# Patient Record
Sex: FEMALE | Race: OTHER | NOT HISPANIC OR LATINO
[De-identification: names, ages, dates, MRNs, and addresses within clinical notes are randomized per-mention and may not be internally consistent; named-entity substitution may affect disease eponyms.]

---

## 2017-05-16 ENCOUNTER — LABORATORY RESULT (OUTPATIENT)
Age: 57
End: 2017-05-16

## 2017-05-16 ENCOUNTER — APPOINTMENT (OUTPATIENT)
Dept: ENDOCRINOLOGY | Facility: CLINIC | Age: 57
End: 2017-05-16

## 2017-05-16 VITALS
DIASTOLIC BLOOD PRESSURE: 66 MMHG | HEIGHT: 63 IN | HEART RATE: 70 BPM | WEIGHT: 159 LBS | SYSTOLIC BLOOD PRESSURE: 118 MMHG | BODY MASS INDEX: 28.17 KG/M2

## 2017-05-17 LAB
25(OH)D3 SERPL-MCNC: 28.8 NG/ML
ALBUMIN SERPL ELPH-MCNC: 4.6 G/DL
ALP BLD-CCNC: 101 U/L
ALT SERPL-CCNC: 23 U/L
ANION GAP SERPL CALC-SCNC: 13 MMOL/L
AST SERPL-CCNC: 22 U/L
BILIRUB SERPL-MCNC: 0.2 MG/DL
BUN SERPL-MCNC: 15 MG/DL
CALCIUM SERPL-MCNC: 9.3 MG/DL
CALCIUM SERPL-MCNC: 9.3 MG/DL
CHLORIDE SERPL-SCNC: 105 MMOL/L
CO2 SERPL-SCNC: 28 MMOL/L
CREAT SERPL-MCNC: 0.69 MG/DL
FSH SERPL-MCNC: 56.3 IU/L
GLUCOSE SERPL-MCNC: 98 MG/DL
LH SERPL-ACNC: 26.5 IU/L
MAGNESIUM SERPL-MCNC: 2.3 MG/DL
PARATHYROID HORMONE INTACT: 51 PG/ML
PHOSPHATE SERPL-MCNC: 4 MG/DL
POTASSIUM SERPL-SCNC: 4.3 MMOL/L
PROLACTIN SERPL-MCNC: 34.1 NG/ML
PROT SERPL-MCNC: 7.6 G/DL
SODIUM SERPL-SCNC: 146 MMOL/L
T4 FREE SERPL-MCNC: 1.2 NG/DL
T4 SERPL-MCNC: 8.3 UG/DL
TSH SERPL-ACNC: 1.88 UIU/ML

## 2017-05-18 LAB — COLLAGEN CTX SERPL-MCNC: 204 PG/ML

## 2017-08-15 ENCOUNTER — APPOINTMENT (OUTPATIENT)
Dept: ENDOCRINOLOGY | Facility: CLINIC | Age: 57
End: 2017-08-15
Payer: SELF-PAY

## 2017-08-15 VITALS
HEIGHT: 63 IN | BODY MASS INDEX: 29.41 KG/M2 | DIASTOLIC BLOOD PRESSURE: 72 MMHG | WEIGHT: 166 LBS | SYSTOLIC BLOOD PRESSURE: 118 MMHG | HEART RATE: 68 BPM

## 2017-08-15 PROCEDURE — 99214 OFFICE O/P EST MOD 30 MIN: CPT

## 2017-10-24 ENCOUNTER — LABORATORY RESULT (OUTPATIENT)
Age: 57
End: 2017-10-24

## 2017-10-24 ENCOUNTER — APPOINTMENT (OUTPATIENT)
Dept: ENDOCRINOLOGY | Facility: CLINIC | Age: 57
End: 2017-10-24
Payer: SELF-PAY

## 2017-10-24 VITALS
WEIGHT: 159 LBS | HEIGHT: 63 IN | SYSTOLIC BLOOD PRESSURE: 116 MMHG | BODY MASS INDEX: 28.17 KG/M2 | DIASTOLIC BLOOD PRESSURE: 70 MMHG | HEART RATE: 68 BPM

## 2017-10-24 DIAGNOSIS — M54.2 CERVICALGIA: ICD-10-CM

## 2017-10-24 PROCEDURE — 36415 COLL VENOUS BLD VENIPUNCTURE: CPT

## 2017-10-24 PROCEDURE — 99214 OFFICE O/P EST MOD 30 MIN: CPT | Mod: 25

## 2017-10-25 LAB
PROLACTIN SERPL-MCNC: 30.4 NG/ML
TSH SERPL-ACNC: 1.1 UIU/ML

## 2018-03-05 ENCOUNTER — APPOINTMENT (OUTPATIENT)
Dept: ENDOCRINOLOGY | Facility: CLINIC | Age: 58
End: 2018-03-05

## 2018-04-23 ENCOUNTER — MEDICATION RENEWAL (OUTPATIENT)
Age: 58
End: 2018-04-23

## 2018-09-04 ENCOUNTER — APPOINTMENT (OUTPATIENT)
Dept: ENDOCRINOLOGY | Facility: CLINIC | Age: 58
End: 2018-09-04
Payer: COMMERCIAL

## 2018-09-04 VITALS
DIASTOLIC BLOOD PRESSURE: 73 MMHG | HEIGHT: 63 IN | HEART RATE: 75 BPM | WEIGHT: 160 LBS | BODY MASS INDEX: 28.35 KG/M2 | OXYGEN SATURATION: 94 % | SYSTOLIC BLOOD PRESSURE: 113 MMHG

## 2018-09-04 PROCEDURE — 99214 OFFICE O/P EST MOD 30 MIN: CPT

## 2018-10-02 ENCOUNTER — MEDICATION RENEWAL (OUTPATIENT)
Age: 58
End: 2018-10-02

## 2018-10-03 ENCOUNTER — RX RENEWAL (OUTPATIENT)
Age: 58
End: 2018-10-03

## 2018-12-05 ENCOUNTER — RX RENEWAL (OUTPATIENT)
Age: 58
End: 2018-12-05

## 2019-03-05 ENCOUNTER — APPOINTMENT (OUTPATIENT)
Dept: ENDOCRINOLOGY | Facility: CLINIC | Age: 59
End: 2019-03-05
Payer: COMMERCIAL

## 2019-03-05 VITALS
OXYGEN SATURATION: 95 % | HEIGHT: 63 IN | BODY MASS INDEX: 28.17 KG/M2 | DIASTOLIC BLOOD PRESSURE: 76 MMHG | WEIGHT: 159 LBS | SYSTOLIC BLOOD PRESSURE: 115 MMHG | HEART RATE: 77 BPM

## 2019-03-05 PROCEDURE — 99214 OFFICE O/P EST MOD 30 MIN: CPT

## 2019-03-05 NOTE — ASSESSMENT
[Bisphosphonate Therapy] : Risks  and benefits of bisphosphonate therapy were  discussed with the patient including gastroesophageal irritation, osteonecrosis of the jaw, and atypical femur fractures, and acute phase reaction [Levothyroxine] : The patient was instructed to take Levothyroxine on an empty stomach, separate from vitamins, and wait at least 30 minutes before eating [FreeTextEntry1] : 1 - Pituitary microadenoma off bromocriptine -, last prolactin was slightly elevated but recent one is normal. ,  MRI done November 2015 was stable.Recheck MRI Oct discusses  post surgical changes, but she never had surgery will call radiologist to review..\par 2) Hypothyroidism - she is stable on levothyroxine 75 mcg daily \par 3)  postmenopausal osteoporosis  - continue Boniva 150 mg daily, BD to be done this week. \par \par f/u 4 months

## 2019-03-05 NOTE — HISTORY OF PRESENT ILLNESS
[FreeTextEntry1] : Patient with prolactinoma, microadenoma. Since she has been postmenopausal she has been off Parlodel for 3 years. She denies headaches, visual loss, galactorrhea or breast tenderness. She saw her ophthalmologist this summer and was told she had a normal visual field test. An MRI of the pituitary Nov 2015 was stable.. Was repeated 10/2018. She is compliant with her levothyroxine. She does complain of increased hair loss.She denies any significant headaches or galactorrhea.A recent bone density does show osteoporosis and she been on Boniva the past 12 months without significant side effects. She is due for a BD. She recently had surgery right hand because of glass wound.

## 2019-03-05 NOTE — PHYSICAL EXAM
[Alert] : alert [No Acute Distress] : no acute distress [Well Nourished] : well nourished [Well Developed] : well developed [Normal Sclera/Conjunctiva] : normal sclera/conjunctiva [EOMI] : extra ocular movement intact [No Proptosis] : no proptosis [Normal Oropharynx] : the oropharynx was normal [Thyroid Not Enlarged] : the thyroid was not enlarged [No Thyroid Nodules] : there were no palpable thyroid nodules [No Respiratory Distress] : no respiratory distress [No Accessory Muscle Use] : no accessory muscle use [Clear to Auscultation] : lungs were clear to auscultation bilaterally [Normal Rate] : heart rate was normal  [Normal S1, S2] : normal S1 and S2 [Regular Rhythm] : with a regular rhythm [Pedal Pulses Normal] : the pedal pulses are present [No Edema] : there was no peripheral edema [Normal Bowel Sounds] : normal bowel sounds [Not Tender] : non-tender [Soft] : abdomen soft [Not Distended] : not distended [Post Cervical Nodes] : posterior cervical nodes [Anterior Cervical Nodes] : anterior cervical nodes [Axillary Nodes] : axillary nodes [Normal] : normal and non tender [No Spinal Tenderness] : no spinal tenderness [Spine Straight] : spine straight [No Stigmata of Cushings Syndrome] : no stigmata of cushings syndrome [Normal Gait] : normal gait [Normal Strength/Tone] : muscle strength and tone were normal [No Rash] : no rash [Normal Reflexes] : deep tendon reflexes were 2+ and symmetric [No Tremors] : no tremors [Oriented x3] : oriented to person, place, and time [Acanthosis Nigricans] : no acanthosis nigricans [de-identified] : VF intact

## 2019-07-09 ENCOUNTER — APPOINTMENT (OUTPATIENT)
Dept: ENDOCRINOLOGY | Facility: CLINIC | Age: 59
End: 2019-07-09

## 2019-09-09 ENCOUNTER — MEDICATION RENEWAL (OUTPATIENT)
Age: 59
End: 2019-09-09

## 2019-12-17 ENCOUNTER — APPOINTMENT (OUTPATIENT)
Dept: ENDOCRINOLOGY | Facility: CLINIC | Age: 59
End: 2019-12-17
Payer: COMMERCIAL

## 2019-12-17 VITALS
SYSTOLIC BLOOD PRESSURE: 117 MMHG | HEART RATE: 72 BPM | BODY MASS INDEX: 27.89 KG/M2 | HEIGHT: 63 IN | OXYGEN SATURATION: 98 % | WEIGHT: 157.38 LBS | DIASTOLIC BLOOD PRESSURE: 74 MMHG

## 2019-12-17 PROCEDURE — 99214 OFFICE O/P EST MOD 30 MIN: CPT

## 2019-12-17 RX ORDER — CHLORHEXIDINE GLUCONATE 4 %
LIQUID (ML) TOPICAL
Refills: 0 | Status: ACTIVE | COMMUNITY

## 2019-12-17 NOTE — ASSESSMENT
[Levothyroxine] : The patient was instructed to take Levothyroxine on an empty stomach, separate from vitamins, and wait at least 30 minutes before eating [Importance of Diet and Exercise] : importance of diet and exercise to improve glycemic control, achieve weight loss and improve cardiovascular health [FreeTextEntry1] : 1 - Pituitary microadenoma off bromocriptine -, last prolactin was slightly elevated but recent one is normal. ,  MRI done October 2018 was stable.with  post surgical changes, but she never had surgery will call radiologist to review..\par 2) Hypothyroidism - she is stable on levothyroxine 75 mcg daily  Check TFTs \par 3)  postmenopausal osteoporosis  - continue Boniva 150 mg daily, 3/2019 BD showed improvement \par f/u 4- 6  months

## 2019-12-17 NOTE — HISTORY OF PRESENT ILLNESS
[FreeTextEntry1] : Patient with prolactinoma, microadenoma. Since she has been postmenopausal she has been off Parlodel for 3 -4 years. She denies headaches, visual loss, galactorrhea or breast tenderness. She saw her ophthalmologist this summer and was told she had a normal visual field test. An MRI of the pituitary Nov 2015 was stable.. Was repeated 10/2018 and stable . She is compliant with her levothyroxine. She does complain of increased hair loss.She denies any significant headaches or galactorrhea.A recent bone density does show osteoporosis and she been on Boniva the past 2 yeas without significant side effects. she did run out 2 months ago.

## 2019-12-17 NOTE — PHYSICAL EXAM
[Alert] : alert [Well Nourished] : well nourished [Well Developed] : well developed [No Acute Distress] : no acute distress [Normal Sclera/Conjunctiva] : normal sclera/conjunctiva [EOMI] : extra ocular movement intact [No Proptosis] : no proptosis [Normal Oropharynx] : the oropharynx was normal [Thyroid Not Enlarged] : the thyroid was not enlarged [No Thyroid Nodules] : there were no palpable thyroid nodules [No Respiratory Distress] : no respiratory distress [No Accessory Muscle Use] : no accessory muscle use [Normal Rate] : heart rate was normal  [Clear to Auscultation] : lungs were clear to auscultation bilaterally [Regular Rhythm] : with a regular rhythm [Normal S1, S2] : normal S1 and S2 [No Edema] : there was no peripheral edema [Pedal Pulses Normal] : the pedal pulses are present [Normal Bowel Sounds] : normal bowel sounds [Not Tender] : non-tender [Soft] : abdomen soft [Post Cervical Nodes] : posterior cervical nodes [Not Distended] : not distended [Anterior Cervical Nodes] : anterior cervical nodes [Axillary Nodes] : axillary nodes [Normal] : normal and non tender [No Spinal Tenderness] : no spinal tenderness [Spine Straight] : spine straight [No Stigmata of Cushings Syndrome] : no stigmata of cushings syndrome [Normal Gait] : normal gait [No Rash] : no rash [Normal Strength/Tone] : muscle strength and tone were normal [No Tremors] : no tremors [Oriented x3] : oriented to person, place, and time [Normal Reflexes] : deep tendon reflexes were 2+ and symmetric [Acanthosis Nigricans] : no acanthosis nigricans [de-identified] : VF intact

## 2020-07-07 ENCOUNTER — APPOINTMENT (OUTPATIENT)
Dept: ENDOCRINOLOGY | Facility: CLINIC | Age: 60
End: 2020-07-07
Payer: COMMERCIAL

## 2020-07-07 VITALS
OXYGEN SATURATION: 95 % | WEIGHT: 157.25 LBS | BODY MASS INDEX: 28.21 KG/M2 | HEIGHT: 62.6 IN | DIASTOLIC BLOOD PRESSURE: 76 MMHG | SYSTOLIC BLOOD PRESSURE: 117 MMHG | HEART RATE: 85 BPM

## 2020-07-07 PROCEDURE — 99214 OFFICE O/P EST MOD 30 MIN: CPT

## 2020-07-07 NOTE — ASSESSMENT
[FreeTextEntry1] : 1 - Pituitary microadenoma off bromocriptine -, last prolactin was slightly elevated but recent one is normal. ,  MRI done October 2018 was stable.with  post surgical changes, but she never had surgery will call radiologist to review..\par 2) Hypothyroidism - she is stable on levothyroxine 75 mcg daily  renewed\par 3)  postmenopausal osteoporosis  - continue Boniva 150 mg daily, 3/2019 BD showed improvement \par 4) recovered from Covid 19 illness \par f/u 4- 6  months

## 2020-07-07 NOTE — PHYSICAL EXAM
[Alert] : alert [Well Nourished] : well nourished [No Acute Distress] : no acute distress [Well Developed] : well developed [Normal Sclera/Conjunctiva] : normal sclera/conjunctiva [EOMI] : extra ocular movement intact [No Proptosis] : no proptosis [Normal Oropharynx] : the oropharynx was normal [Thyroid Not Enlarged] : the thyroid was not enlarged [No Respiratory Distress] : no respiratory distress [No Thyroid Nodules] : no palpable thyroid nodules [No Accessory Muscle Use] : no accessory muscle use [Clear to Auscultation] : lungs were clear to auscultation bilaterally [Normal S1, S2] : normal S1 and S2 [Normal Rate] : heart rate was normal [Regular Rhythm] : with a regular rhythm [No Edema] : no peripheral edema [Pedal Pulses Normal] : the pedal pulses are present [No Galactorrhea] : no galactorrhea [Normal Bowel Sounds] : normal bowel sounds [Not Distended] : not distended [Not Tender] : non-tender [Soft] : abdomen soft [No Spinal Tenderness] : no spinal tenderness [Normal Gait] : normal gait [Spine Straight] : spine straight [No Stigmata of Cushings Syndrome] : no stigmata of Cushings Syndrome [Normal Strength/Tone] : muscle strength and tone were normal [No Rash] : no rash [Normal Reflexes] : deep tendon reflexes were 2+ and symmetric [No Tremors] : no tremors [Acanthosis Nigricans] : no acanthosis nigricans [Oriented x3] : oriented to person, place, and time

## 2020-07-07 NOTE — HISTORY OF PRESENT ILLNESS
[FreeTextEntry1] : Patient with prolactinoma, microadenoma. Since she has been postmenopausal she has been off Parlodel for 3 -4 years. She denies headaches, visual loss, galactorrhea or breast tenderness. She saw her ophthalmologist this summer and was told she had a normal visual field test. An MRI of the pituitary Nov 2015 was stable.. Was repeated 10/2018 and stable . She is compliant with her levothyroxine. She does complain of increased hair loss.She denies any significant headaches or galactorrhea.A recent bone density does show osteoporosis and she been on Boniva the past 2 yeas without significant side effects. \par In march she was hospitalized with pneumonia for Covid 19. She has since had a negative PCR test and has + antibodies. She feels back to baseline.

## 2020-07-07 NOTE — REASON FOR VISIT
[Follow - Up] : a follow-up visit [Hypothyroidism] : hypothyroidism [Pituitary Evaluation/ Disorder] : pituitary evaluation/disorder [Osteoporosis] : osteoporosis [[employer] ] : provided by [employer]   [FreeTextEntry1] : [id number] [FreeTextEntry2] : Vinicio [FreeTextEntry3] : 82 [TWNoteComboBox1] : Citizen of [place]

## 2020-11-24 ENCOUNTER — APPOINTMENT (OUTPATIENT)
Dept: ENDOCRINOLOGY | Facility: CLINIC | Age: 60
End: 2020-11-24
Payer: COMMERCIAL

## 2020-11-24 VITALS
HEART RATE: 69 BPM | BODY MASS INDEX: 28.35 KG/M2 | TEMPERATURE: 97.5 F | SYSTOLIC BLOOD PRESSURE: 116 MMHG | WEIGHT: 158 LBS | OXYGEN SATURATION: 95 % | DIASTOLIC BLOOD PRESSURE: 78 MMHG | HEIGHT: 62.6 IN

## 2020-11-24 PROCEDURE — 99214 OFFICE O/P EST MOD 30 MIN: CPT

## 2020-11-24 NOTE — PHYSICAL EXAM
[Alert] : alert [Well Nourished] : well nourished [No Acute Distress] : no acute distress [Well Developed] : well developed [Normal Sclera/Conjunctiva] : normal sclera/conjunctiva [EOMI] : extra ocular movement intact [No Proptosis] : no proptosis [Normal Oropharynx] : the oropharynx was normal [Thyroid Not Enlarged] : the thyroid was not enlarged [No Thyroid Nodules] : no palpable thyroid nodules [No Respiratory Distress] : no respiratory distress [No Accessory Muscle Use] : no accessory muscle use [Clear to Auscultation] : lungs were clear to auscultation bilaterally [Normal S1, S2] : normal S1 and S2 [Normal Rate] : heart rate was normal [Regular Rhythm] : with a regular rhythm [No Edema] : no peripheral edema [Pedal Pulses Normal] : the pedal pulses are present [No Galactorrhea] : no galactorrhea [Normal Bowel Sounds] : normal bowel sounds [Not Tender] : non-tender [Not Distended] : not distended [Soft] : abdomen soft [No Spinal Tenderness] : no spinal tenderness [Spine Straight] : spine straight [No Stigmata of Cushings Syndrome] : no stigmata of Cushings Syndrome [Normal Gait] : normal gait [Normal Strength/Tone] : muscle strength and tone were normal [No Rash] : no rash [Normal Reflexes] : deep tendon reflexes were 2+ and symmetric [No Tremors] : no tremors [Oriented x3] : oriented to person, place, and time [Acanthosis Nigricans] : no acanthosis nigricans

## 2020-11-24 NOTE — HISTORY OF PRESENT ILLNESS
[FreeTextEntry1] : Patient with prolactinoma, microadenoma. Since she has been postmenopausal she has been off Parlodel for 3 -4 years. She denies headaches, visual loss, galactorrhea or breast tenderness. She saw her ophthalmologist this summer and was told she had a normal visual field test. An MRI of the pituitary Nov 2018 was stable.. She is compliant with her levothyroxine. She does complain of increased hair loss.She denies any significant headaches or galactorrhea.A recent bone density does show osteoporosis and she been on Boniva the past 2 yeas without significant side effects. She did run out in April and did not renew. \par In march she was hospitalized with pneumonia for Covid 19. She has since had a negative PCR test and has + antibodies. She feels back to baseline.

## 2020-11-24 NOTE — ASSESSMENT
[FreeTextEntry1] : 1 - Pituitary microadenoma off bromocriptine -, last prolactin was slightly elevated but recent one is normal. ,  MRI done October 2018 was stable.with  post surgical changes, but she never had surgery will call radiologist to review..To repeat, \par 2) Hypothyroidism - she is stable on levothyroxine 75 mcg daily  renewed\par 3)  postmenopausal osteoporosis  - continue Boniva 150 mg daily renewed., 3/2019 BD showed improvement To do in march. \par 4) recovered from Covid 19 illness \par f/u 4  months

## 2020-11-24 NOTE — REASON FOR VISIT
[Follow - Up] : a follow-up visit [Hypothyroidism] : hypothyroidism [Pituitary Evaluation/ Disorder] : pituitary evaluation/disorder [Osteoporosis] : osteoporosis [[employer] ] : provided by [employer]   [FreeTextEntry1] : [id number] [FreeTextEntry2] : Elton [TWNoteComboBox1] : [place]

## 2021-04-05 ENCOUNTER — APPOINTMENT (OUTPATIENT)
Dept: ENDOCRINOLOGY | Facility: CLINIC | Age: 61
End: 2021-04-05
Payer: COMMERCIAL

## 2021-04-05 VITALS
WEIGHT: 161.25 LBS | OXYGEN SATURATION: 95 % | TEMPERATURE: 96.41 F | SYSTOLIC BLOOD PRESSURE: 122 MMHG | BODY MASS INDEX: 28.57 KG/M2 | DIASTOLIC BLOOD PRESSURE: 72 MMHG | HEIGHT: 63 IN | HEART RATE: 67 BPM

## 2021-04-05 PROCEDURE — 99214 OFFICE O/P EST MOD 30 MIN: CPT

## 2021-04-05 PROCEDURE — 99072 ADDL SUPL MATRL&STAF TM PHE: CPT

## 2021-04-05 NOTE — REASON FOR VISIT
[Follow - Up] : a follow-up visit [Hypothyroidism] : hypothyroidism [Pituitary Evaluation/ Disorder] : pituitary evaluation/disorder [[employer] ] : provided by [employer]   [FreeTextEntry1] : [id number] [FreeTextEntry2] : arjun [TWNoteComboBox1] : [place]

## 2021-04-05 NOTE — HISTORY OF PRESENT ILLNESS
[FreeTextEntry1] : Patient with prolactinoma, microadenoma. Since she has been postmenopausal she has been off Parlodel for 3 -4 years. She denies headaches, visual loss, galactorrhea or breast tenderness. She saw her ophthalmologist this summer and was told she had a normal visual field test. An MRI of the pituitary 12/202 was stable.. She is compliant with her levothyroxine. She does complain of increased hair loss.She denies any significant headaches or galactorrhea.A recent bone density does show osteoporosis and she been on Boniva the past 3 yeas without significant side effects. She did run out in April and did not renew. \par In march 2021 she was hospitalized with pneumonia for Covid 19. She has since had a negative PCR test and has + antibodies. She feels back to baseline. Due to get vaccine.

## 2021-04-05 NOTE — PHYSICAL EXAM
[Alert] : alert [Well Nourished] : well nourished [No Acute Distress] : no acute distress [Well Developed] : well developed [Normal Sclera/Conjunctiva] : normal sclera/conjunctiva [EOMI] : extra ocular movement intact [No Proptosis] : no proptosis [Normal Oropharynx] : the oropharynx was normal [Thyroid Not Enlarged] : the thyroid was not enlarged [No Thyroid Nodules] : no palpable thyroid nodules [No Respiratory Distress] : no respiratory distress [No Accessory Muscle Use] : no accessory muscle use [Clear to Auscultation] : lungs were clear to auscultation bilaterally [Normal S1, S2] : normal S1 and S2 [Normal Rate] : heart rate was normal [Regular Rhythm] : with a regular rhythm [No Edema] : no peripheral edema [Pedal Pulses Normal] : the pedal pulses are present [No Galactorrhea] : no galactorrhea [Normal Bowel Sounds] : normal bowel sounds [Not Tender] : non-tender [Not Distended] : not distended [Soft] : abdomen soft [No Spinal Tenderness] : no spinal tenderness [Spine Straight] : spine straight [No Stigmata of Cushings Syndrome] : no stigmata of Cushings Syndrome [Normal Gait] : normal gait [Normal Strength/Tone] : muscle strength and tone were normal [No Rash] : no rash [Acanthosis Nigricans] : no acanthosis nigricans [Normal Reflexes] : deep tendon reflexes were 2+ and symmetric [No Tremors] : no tremors [Oriented x3] : oriented to person, place, and time

## 2021-04-05 NOTE — ASSESSMENT
[FreeTextEntry1] : 1 - Pituitary microadenoma off bromocriptine -, last prolactin was slightly elevated but recent one is normal. ,  MRI done 12/2020  was stable.with  post surgical changes, but she never had surgery will call radiologist to review..To repeat, \par 2) Hypothyroidism - she is stable on levothyroxine 75 mcg daily  renewed\par 3)  postmenopausal osteoporosis  - continue Boniva 150 mg daily renewed., 3/2019 BD showed improvement recent one stable. Will stop in august after 4 years. \par 4) recovered from Covid 19 illness to get vaccine. \par f/u 4  months [Bisphosphonate Therapy] : Risks and benefits of bisphosphonate therapy were  discussed with the patient including gastroesophageal irritation, osteonecrosis of the jaw, and atypical femur fractures, and acute phase reaction [Bisphosphonates] : The patient was instructed to take bisphosphonates on an empty stomach with a full glass of water,and wait at least 30 minutes before eating or lying down [Levothyroxine] : The patient was instructed to take Levothyroxine on an empty stomach, separate from vitamins, and wait at least 30 minutes before eating

## 2021-07-01 ENCOUNTER — RX RENEWAL (OUTPATIENT)
Age: 61
End: 2021-07-01

## 2021-08-09 ENCOUNTER — APPOINTMENT (OUTPATIENT)
Dept: ENDOCRINOLOGY | Facility: CLINIC | Age: 61
End: 2021-08-09
Payer: COMMERCIAL

## 2021-08-09 VITALS
TEMPERATURE: 98.3 F | WEIGHT: 160.5 LBS | SYSTOLIC BLOOD PRESSURE: 130 MMHG | HEIGHT: 63 IN | BODY MASS INDEX: 28.44 KG/M2 | DIASTOLIC BLOOD PRESSURE: 84 MMHG | OXYGEN SATURATION: 98 % | HEART RATE: 67 BPM

## 2021-08-09 LAB
25(OH)D3 SERPL-MCNC: 53.2 NG/ML
ALBUMIN SERPL ELPH-MCNC: 4.5 G/DL
ALP BLD-CCNC: 83 U/L
ALT SERPL-CCNC: 26 U/L
ANION GAP SERPL CALC-SCNC: 12 MMOL/L
APPEARANCE: CLEAR
AST SERPL-CCNC: 18 U/L
BILIRUB SERPL-MCNC: 0.3 MG/DL
BILIRUBIN URINE: NEGATIVE
BLOOD URINE: NEGATIVE
BUN SERPL-MCNC: 12 MG/DL
CALCIUM SERPL-MCNC: 9.4 MG/DL
CHLORIDE SERPL-SCNC: 106 MMOL/L
CHOLEST SERPL-MCNC: 245 MG/DL
CO2 SERPL-SCNC: 26 MMOL/L
COLOR: YELLOW
CORTIS SERPL-MCNC: 6.1 UG/DL
CREAT SERPL-MCNC: 0.79 MG/DL
GLUCOSE QUALITATIVE U: NEGATIVE
GLUCOSE SERPL-MCNC: 92 MG/DL
HDLC SERPL-MCNC: 57 MG/DL
IGF-1 INTERP: NORMAL
IGF-I BLD-MCNC: 51 NG/ML
KETONES URINE: NEGATIVE
LDLC SERPL CALC-MCNC: 177 MG/DL
LEUKOCYTE ESTERASE URINE: NEGATIVE
NITRITE URINE: NEGATIVE
NONHDLC SERPL-MCNC: 188 MG/DL
PH URINE: 6.5
POTASSIUM SERPL-SCNC: 4.5 MMOL/L
PROLACTIN SERPL-MCNC: 20.6 NG/ML
PROT SERPL-MCNC: 7.2 G/DL
PROTEIN URINE: NORMAL
SODIUM SERPL-SCNC: 143 MMOL/L
SPECIFIC GRAVITY URINE: 1.02
T4 FREE SERPL-MCNC: 1.1 NG/DL
T4 SERPL-MCNC: 7 UG/DL
TRIGL SERPL-MCNC: 55 MG/DL
TSH SERPL-ACNC: 1.92 UIU/ML
UROBILINOGEN URINE: NORMAL

## 2021-08-09 PROCEDURE — 99214 OFFICE O/P EST MOD 30 MIN: CPT

## 2021-08-09 NOTE — HISTORY OF PRESENT ILLNESS
[FreeTextEntry1] : Patient with prolactinoma, microadenoma. Since she has been postmenopausal she has been off Parlodel for 3 -4 years. She denies headaches, visual loss, galactorrhea or breast tenderness. She saw her ophthalmologist this summer and was told she had a normal visual field test. An MRI of the pituitary 12/2020 was stable.. She is compliant with her levothyroxine. She does complain of increased hair loss.She denies any significant headaches or galactorrhea.A recent bone density does show osteoporosis and she been on Boniva the past 4 yeas without significant side effects. \par In march 2021 she was hospitalized with pneumonia for Covid 19. She has since had a negative PCR test and has + antibodies. She feels back to baseline. Due to get vaccine.

## 2021-08-09 NOTE — REASON FOR VISIT
[Follow - Up] : a follow-up visit [Hypothyroidism] : hypothyroidism [Pituitary Evaluation/ Disorder] : pituitary evaluation/disorder [Osteoporosis] : osteoporosis

## 2021-08-09 NOTE — ASSESSMENT
[FreeTextEntry1] : 1 - Pituitary microadenoma off bromocriptine -, prior prolactin was slightly elevated but recent ones are  normal. ,  MRI done 12/2020  was stable.with  post surgical changes, but she never had surgery will call radiologist to review.., \par 2) Hypothyroidism - she is stable on levothyroxine 75 mcg daily  \par 3)  postmenopausal osteoporosis  - Has been 4 years so will stop  Boniva.. encourage weight bearing exercise vitamin D and calcium supplement\par 4) recovered from Covid 19 illness and had  vaccine in April 2021. . \par f/u 6  months [Levothyroxine] : The patient was instructed to take Levothyroxine on an empty stomach, separate from vitamins, and wait at least 30 minutes before eating

## 2022-01-03 ENCOUNTER — RX RENEWAL (OUTPATIENT)
Age: 62
End: 2022-01-03

## 2022-02-07 ENCOUNTER — APPOINTMENT (OUTPATIENT)
Dept: ENDOCRINOLOGY | Facility: CLINIC | Age: 62
End: 2022-02-07
Payer: COMMERCIAL

## 2022-02-07 VITALS
SYSTOLIC BLOOD PRESSURE: 107 MMHG | HEART RATE: 73 BPM | OXYGEN SATURATION: 96 % | WEIGHT: 161 LBS | HEIGHT: 63 IN | DIASTOLIC BLOOD PRESSURE: 68 MMHG | BODY MASS INDEX: 28.53 KG/M2

## 2022-02-07 PROCEDURE — 99214 OFFICE O/P EST MOD 30 MIN: CPT

## 2022-02-07 NOTE — HISTORY OF PRESENT ILLNESS
[FreeTextEntry1] : Patient with prolactinoma, microadenoma. Since she has been postmenopausal she has been off Parlodel for 3 -4 years. She denies headaches, visual loss, galactorrhea or breast tenderness. She saw her ophthalmologist this summer and was told she had a normal visual field test. An MRI of the pituitary 12/2020 was stable.. She is compliant with her levothyroxine. She does complain of increased hair loss.She denies any significant headaches or galactorrhea.A  bone density does show osteoporosis and she been on Boniva the past 4 yeas without significant side effects. \par In march 2021 she was hospitalized with pneumonia for Covid 19. She has since had a negative PCR test and has + antibodies. She feels back to baseline. Had vaccine and booster.

## 2022-02-07 NOTE — ASSESSMENT
[FreeTextEntry1] : 1 - Pituitary microadenoma off bromocriptine -, prior prolactin was slightly elevated but recent ones are  normal. ,  MRI done 12/2020  was stable.with  post surgical changes, but she never had surgery will call radiologist to review.., \par 2) Hypothyroidism - has been stable on levothyroxine 75 mcg daily  \par 3)  postmenopausal osteoporosis  - Has been 4 years so now off   Boniva.. encourage weight bearing exercise vitamin D and calcium supplement\par 4) recovered from Covid 19 illness and had  vaccine in April 2021. . \par f/u 6  months\par labs to be done this week.  [Levothyroxine] : The patient was instructed to take Levothyroxine on an empty stomach, separate from vitamins, and wait at least 30 minutes before eating

## 2022-02-08 ENCOUNTER — LABORATORY RESULT (OUTPATIENT)
Age: 62
End: 2022-02-08

## 2022-05-10 LAB
25(OH)D3 SERPL-MCNC: 25.3 NG/ML
ALBUMIN SERPL ELPH-MCNC: 4.8 G/DL
ALP BLD-CCNC: 95 U/L
ALT SERPL-CCNC: 19 U/L
ANION GAP SERPL CALC-SCNC: 9 MMOL/L
APPEARANCE: ABNORMAL
AST SERPL-CCNC: 15 U/L
BILIRUB SERPL-MCNC: 0.5 MG/DL
BILIRUBIN URINE: NEGATIVE
BLOOD URINE: NEGATIVE
BUN SERPL-MCNC: 13 MG/DL
CALCIUM SERPL-MCNC: 10.1 MG/DL
CHLORIDE SERPL-SCNC: 105 MMOL/L
CHOLEST SERPL-MCNC: 239 MG/DL
CO2 SERPL-SCNC: 29 MMOL/L
COLOR: ABNORMAL
CREAT SERPL-MCNC: 0.82 MG/DL
FSH SERPL-MCNC: 54 IU/L
GLUCOSE QUALITATIVE U: NEGATIVE
GLUCOSE SERPL-MCNC: 94 MG/DL
HDLC SERPL-MCNC: 54 MG/DL
KETONES URINE: NEGATIVE
LDLC SERPL CALC-MCNC: 172 MG/DL
LEUKOCYTE ESTERASE URINE: ABNORMAL
LH SERPL-ACNC: 26 IU/L
NITRITE URINE: NEGATIVE
NONHDLC SERPL-MCNC: 185 MG/DL
PH URINE: 6.5
POTASSIUM SERPL-SCNC: 4.7 MMOL/L
PROLACTIN SERPL-MCNC: 22.1 NG/ML
PROT SERPL-MCNC: 7.5 G/DL
PROTEIN URINE: ABNORMAL
SODIUM SERPL-SCNC: 143 MMOL/L
SPECIFIC GRAVITY URINE: 1.03
T4 FREE SERPL-MCNC: 1.3 NG/DL
T4 SERPL-MCNC: 8.8 UG/DL
TRIGL SERPL-MCNC: 64 MG/DL
TSH SERPL-ACNC: 1.07 UIU/ML
UROBILINOGEN URINE: NORMAL

## 2022-08-08 ENCOUNTER — APPOINTMENT (OUTPATIENT)
Dept: ENDOCRINOLOGY | Facility: CLINIC | Age: 62
End: 2022-08-08

## 2022-08-08 VITALS
HEIGHT: 63 IN | DIASTOLIC BLOOD PRESSURE: 83 MMHG | WEIGHT: 161.06 LBS | SYSTOLIC BLOOD PRESSURE: 131 MMHG | BODY MASS INDEX: 28.54 KG/M2 | OXYGEN SATURATION: 95 % | HEART RATE: 87 BPM

## 2022-08-08 DIAGNOSIS — L65.9 NONSCARRING HAIR LOSS, UNSPECIFIED: ICD-10-CM

## 2022-08-08 PROCEDURE — 36415 COLL VENOUS BLD VENIPUNCTURE: CPT

## 2022-08-08 PROCEDURE — 99214 OFFICE O/P EST MOD 30 MIN: CPT | Mod: 25

## 2022-08-08 NOTE — HISTORY OF PRESENT ILLNESS
[FreeTextEntry1] : Patient with prolactinoma, microadenoma. Since she has been postmenopausal she has been off Parlodel for 3 -4 years. She denies headaches, visual loss, galactorrhea or breast tenderness. She saw her ophthalmologist this summer and was told she had a normal visual field test. An MRI of the pituitary 12/2020 was stable.. She is compliant with her levothyroxine. She does complain of increased hair loss.She denies any significant headaches or galactorrhea.A  bone density does show osteoporosis and she  had been on Boniva the past 4 yeas without significant side effects. She has now been off. \par In march 2021 she was hospitalized with pneumonia for Covid 19. She has since had a negative PCR test and has + antibodies. She feels back to baseline. Had vaccine and booster.

## 2022-08-08 NOTE — REASON FOR VISIT
[Follow - Up] : a follow-up visit [Hypothyroidism] : hypothyroidism [Pituitary Evaluation/ Disorder] : pituitary evaluation/disorder [Osteoporosis] : osteoporosis detailed exam

## 2022-08-08 NOTE — ASSESSMENT
[FreeTextEntry1] : 1 - Pituitary microadenoma off bromocriptine -, prior prolactin was slightly elevated but recent ones are  normal. ,  MRI done 12/2020  was stable.with  post surgical changes, but she never had surgery will call radiologist to review..Will repeat 12/22. , \par 2) Hypothyroidism - has been stable on levothyroxine 75 mcg daily  \par 3)  postmenopausal osteoporosis  - Has been 4 years so now off   Boniva.. encourage weight bearing exercise vitamin D and calcium supplement BD 2/23. \par 4) recovered from Covid 19 illness and had  vaccine in April 2021. . \par f/u 6  months\par labs done

## 2022-08-18 LAB
ACTH SER-ACNC: 27.5 PG/ML
ALBUMIN SERPL ELPH-MCNC: 4.6 G/DL
ALP BLD-CCNC: 92 U/L
ALT SERPL-CCNC: 17 U/L
ANION GAP SERPL CALC-SCNC: 12 MMOL/L
AST SERPL-CCNC: 14 U/L
BILIRUB SERPL-MCNC: 0.2 MG/DL
BUN SERPL-MCNC: 12 MG/DL
CALCIUM SERPL-MCNC: 9.6 MG/DL
CHLORIDE SERPL-SCNC: 107 MMOL/L
CO2 SERPL-SCNC: 25 MMOL/L
CORTIS SERPL-MCNC: 7.2 UG/DL
CREAT SERPL-MCNC: 0.72 MG/DL
DHEA-S SERPL-MCNC: 43.3 UG/DL
EGFR: 95 ML/MIN/1.73M2
ESTIMATED AVERAGE GLUCOSE: 117 MG/DL
FSH SERPL-MCNC: 48.4 IU/L
GLUCOSE SERPL-MCNC: 98 MG/DL
HBA1C MFR BLD HPLC: 5.7 %
IGF-1 INTERP: NORMAL
IGF-I BLD-MCNC: 51 NG/ML
LH SERPL-ACNC: 24.5 IU/L
POTASSIUM SERPL-SCNC: 4.1 MMOL/L
PROLACTIN SERPL-MCNC: 19.1 NG/ML
PROT SERPL-MCNC: 7.2 G/DL
SODIUM SERPL-SCNC: 143 MMOL/L
T4 FREE SERPL-MCNC: 1.3 NG/DL
T4 SERPL-MCNC: 8.4 UG/DL
TSH SERPL-ACNC: 1.24 UIU/ML

## 2022-12-29 ENCOUNTER — RX RENEWAL (OUTPATIENT)
Age: 62
End: 2022-12-29

## 2023-02-20 ENCOUNTER — APPOINTMENT (OUTPATIENT)
Dept: ENDOCRINOLOGY | Facility: CLINIC | Age: 63
End: 2023-02-20
Payer: COMMERCIAL

## 2023-02-20 VITALS
WEIGHT: 160.13 LBS | HEART RATE: 71 BPM | BODY MASS INDEX: 28.37 KG/M2 | DIASTOLIC BLOOD PRESSURE: 84 MMHG | HEIGHT: 63 IN | SYSTOLIC BLOOD PRESSURE: 129 MMHG | OXYGEN SATURATION: 95 %

## 2023-02-20 PROCEDURE — 99214 OFFICE O/P EST MOD 30 MIN: CPT | Mod: 25

## 2023-02-20 NOTE — HISTORY OF PRESENT ILLNESS
[FreeTextEntry1] : Patient with prolactinoma, microadenoma. Since she has been postmenopausal she has been off Parlodel for 3 -4 years. She denies headaches, visual loss, galactorrhea or breast tenderness. She saw her ophthalmologist this summer and was told she had a normal visual field test. An MRI of the pituitary 12/2020 was stable.. She is compliant with her levothyroxine. She does complain of increased hair loss.She denies any significant headaches or galactorrhea.A  bone density does show osteoporosis and she  had been on Boniva the past 4 yeas without significant side effects. She has now been off. \par In march 2021 she was hospitalized with pneumonia for Covid 19. She has since had a negative PCR test and has + antibodies. She feels back to baseline. Had vaccine and booster. \par Last mos had Flu and still feels discomfort in chest did not have CXR.

## 2023-02-20 NOTE — ASSESSMENT
[Bisphosphonate Therapy] : Risks and benefits of bisphosphonate therapy were  discussed with the patient including gastroesophageal irritation, osteonecrosis of the jaw, and atypical femur fractures, and acute phase reaction [Bisphosphonates] : The patient was instructed to take bisphosphonates on an empty stomach with a full glass of water,and wait at least 30 minutes before eating or lying down [Levothyroxine] : The patient was instructed to take Levothyroxine on an empty stomach, separate from vitamins, and wait at least 30 minutes before eating [FreeTextEntry1] : 1 - Pituitary microadenoma off bromocriptine -, prior prolactin was slightly elevated but recent ones are  normal. ,  MRI done 12/2020  was stable.with  post surgical changes, but she never had surgery will call radiologist to review..Will repeat  , \par 2) Hypothyroidism - has been stable on levothyroxine 75 mcg daily  \par 3)  postmenopausal osteoporosis  - Has been 4 years so now off   Boniva.. encourage weight bearing exercise vitamin D and calcium supplement BD 2/23. \par 4) recovered from Covid 19 illness and had  vaccine in April 2021. . \par 5) f/u PCP re recent Flu illness \par f/u 6  months\par labs done

## 2023-03-09 LAB
COLLAGEN CTX SERPL-MCNC: 276 PG/ML
CORTIS SERPL-MCNC: 8.1 UG/DL
PROLACTIN SERPL-MCNC: 17.3 NG/ML

## 2023-03-29 ENCOUNTER — RX RENEWAL (OUTPATIENT)
Age: 63
End: 2023-03-29

## 2023-08-21 ENCOUNTER — APPOINTMENT (OUTPATIENT)
Dept: ENDOCRINOLOGY | Facility: CLINIC | Age: 63
End: 2023-08-21

## 2023-09-25 ENCOUNTER — RX RENEWAL (OUTPATIENT)
Age: 63
End: 2023-09-25

## 2023-12-04 ENCOUNTER — APPOINTMENT (OUTPATIENT)
Dept: ENDOCRINOLOGY | Facility: CLINIC | Age: 63
End: 2023-12-04
Payer: COMMERCIAL

## 2023-12-04 VITALS
HEIGHT: 63 IN | SYSTOLIC BLOOD PRESSURE: 128 MMHG | WEIGHT: 156.13 LBS | BODY MASS INDEX: 27.66 KG/M2 | DIASTOLIC BLOOD PRESSURE: 78 MMHG | OXYGEN SATURATION: 94 % | HEART RATE: 96 BPM

## 2023-12-04 PROCEDURE — 99214 OFFICE O/P EST MOD 30 MIN: CPT

## 2023-12-05 ENCOUNTER — APPOINTMENT (OUTPATIENT)
Dept: ENDOCRINOLOGY | Facility: CLINIC | Age: 63
End: 2023-12-05
Payer: COMMERCIAL

## 2023-12-07 ENCOUNTER — APPOINTMENT (OUTPATIENT)
Dept: ENDOCRINOLOGY | Facility: CLINIC | Age: 63
End: 2023-12-07
Payer: COMMERCIAL

## 2023-12-07 PROCEDURE — 36415 COLL VENOUS BLD VENIPUNCTURE: CPT

## 2023-12-12 LAB
25(OH)D3 SERPL-MCNC: 22.5 NG/ML
ACTH SER-ACNC: 19 PG/ML
ALBUMIN SERPL ELPH-MCNC: 4.5 G/DL
ALP BLD-CCNC: 88 U/L
ALT SERPL-CCNC: 20 U/L
ANION GAP SERPL CALC-SCNC: 10 MMOL/L
AST SERPL-CCNC: 17 U/L
BILIRUB SERPL-MCNC: 0.4 MG/DL
BUN SERPL-MCNC: 13 MG/DL
CALCIUM SERPL-MCNC: 9.5 MG/DL
CHLORIDE SERPL-SCNC: 105 MMOL/L
CHOLEST SERPL-MCNC: 189 MG/DL
CO2 SERPL-SCNC: 29 MMOL/L
CORTIS SERPL-MCNC: 7.6 UG/DL
CREAT SERPL-MCNC: 0.77 MG/DL
DHEA-S SERPL-MCNC: 36.5 UG/DL
EGFR: 87 ML/MIN/1.73M2
FSH SERPL-MCNC: 50.6 IU/L
GLUCOSE SERPL-MCNC: 94 MG/DL
HDLC SERPL-MCNC: 58 MG/DL
LDLC SERPL CALC-MCNC: 122 MG/DL
LH SERPL-ACNC: 22.9 IU/L
NONHDLC SERPL-MCNC: 131 MG/DL
POTASSIUM SERPL-SCNC: 4.6 MMOL/L
PROLACTIN SERPL-MCNC: 16.6 NG/ML
PROT SERPL-MCNC: 7.5 G/DL
SODIUM SERPL-SCNC: 144 MMOL/L
T4 FREE SERPL-MCNC: 1.2 NG/DL
T4 SERPL-MCNC: 8.1 UG/DL
TRIGL SERPL-MCNC: 51 MG/DL
TSH SERPL-ACNC: 1.2 UIU/ML

## 2024-04-01 ENCOUNTER — RX RENEWAL (OUTPATIENT)
Age: 64
End: 2024-04-01

## 2024-06-10 ENCOUNTER — APPOINTMENT (OUTPATIENT)
Dept: ENDOCRINOLOGY | Facility: CLINIC | Age: 64
End: 2024-06-10
Payer: COMMERCIAL

## 2024-06-10 VITALS
HEART RATE: 76 BPM | SYSTOLIC BLOOD PRESSURE: 116 MMHG | HEIGHT: 63 IN | DIASTOLIC BLOOD PRESSURE: 72 MMHG | OXYGEN SATURATION: 94 % | BODY MASS INDEX: 28.77 KG/M2 | WEIGHT: 162.38 LBS

## 2024-06-10 DIAGNOSIS — M81.0 AGE-RELATED OSTEOPOROSIS W/OUT CURRENT PATHOLOGICAL FRACTURE: ICD-10-CM

## 2024-06-10 DIAGNOSIS — D35.2 BENIGN NEOPLASM OF PITUITARY GLAND: ICD-10-CM

## 2024-06-10 DIAGNOSIS — D70.9 NEUTROPENIA, UNSPECIFIED: ICD-10-CM

## 2024-06-10 DIAGNOSIS — E55.9 VITAMIN D DEFICIENCY, UNSPECIFIED: ICD-10-CM

## 2024-06-10 DIAGNOSIS — E03.9 HYPOTHYROIDISM, UNSPECIFIED: ICD-10-CM

## 2024-06-10 DIAGNOSIS — E78.5 HYPERLIPIDEMIA, UNSPECIFIED: ICD-10-CM

## 2024-06-10 PROCEDURE — 99214 OFFICE O/P EST MOD 30 MIN: CPT

## 2024-06-10 RX ORDER — IBANDRONATE SODIUM 150 MG/1
150 TABLET ORAL
Qty: 3 | Refills: 1 | Status: DISCONTINUED | COMMUNITY
Start: 2017-08-15 | End: 2024-06-10

## 2024-06-10 NOTE — HISTORY OF PRESENT ILLNESS
[FreeTextEntry1] : Patient with prolactinoma, microadenoma. Since she has been postmenopausal, she has been off Parlodel for 3 -4 years. She denies headaches, visual loss, galactorrhea or breast tenderness. She saw her ophthalmologist this summer and was told she had a normal visual field test. An MRI of the pituitary 3/3023 was stable.. She is compliant with her levothyroxine. She does complain of increased hair loss.. A  bone density does show osteoporosis and she had been on Boniva the past 4 yeas without significant side effects. She has now been off.  BD 3/2023 shows osteopenia.

## 2024-06-10 NOTE — ASSESSMENT
[Bisphosphonate Therapy] : Risks and benefits of bisphosphonate therapy were  discussed with the patient including gastroesophageal irritation, osteonecrosis of the jaw, and atypical femur fractures, and acute phase reaction [Levothyroxine] : The patient was instructed to take Levothyroxine on an empty stomach, separate from vitamins, and wait at least 30 minutes before eating [FreeTextEntry1] : 1 - Pituitary microadenoma off bromocriptine -, prior prolactin was slightly elevated but recent ones are normal. , MRI done march 2023 stable. With post-surgical changes, but she never had surgery will call radiologist to review..Will repeat ,  2) Hypothyroidism - has been stable on levothyroxine 75 mcg daily  3) postmenopausal osteoporosis - Has been 4 years so now off Boniva. encourage weight bearing exercise vitamin D and calcium supplement BD 3/2023 shows osteopenia over all stable    f/u 6 months  labs to be done

## 2024-06-19 ENCOUNTER — APPOINTMENT (OUTPATIENT)
Dept: ENDOCRINOLOGY | Facility: CLINIC | Age: 64
End: 2024-06-19
Payer: COMMERCIAL

## 2024-06-19 PROCEDURE — 36415 COLL VENOUS BLD VENIPUNCTURE: CPT

## 2024-06-27 ENCOUNTER — RX RENEWAL (OUTPATIENT)
Age: 64
End: 2024-06-27

## 2024-06-27 LAB
25(OH)D3 SERPL-MCNC: 28.3 NG/ML
ALBUMIN SERPL ELPH-MCNC: 4.4 G/DL
ALP BLD-CCNC: 82 U/L
ALT SERPL-CCNC: 20 U/L
ANION GAP SERPL CALC-SCNC: 10 MMOL/L
AST SERPL-CCNC: 17 U/L
BILIRUB SERPL-MCNC: 0.2 MG/DL
BUN SERPL-MCNC: 11 MG/DL
CALCIUM SERPL-MCNC: 10 MG/DL
CHLORIDE SERPL-SCNC: 106 MMOL/L
CHOLEST SERPL-MCNC: 212 MG/DL
CO2 SERPL-SCNC: 27 MMOL/L
CREAT SERPL-MCNC: 0.84 MG/DL
EGFR: 78 ML/MIN/1.73M2
FSH SERPL-MCNC: 54.7 IU/L
GLUCOSE SERPL-MCNC: 91 MG/DL
HCT VFR BLD CALC: 42.6 %
HDLC SERPL-MCNC: 63 MG/DL
HGB BLD-MCNC: 13.7 G/DL
LDLC SERPL CALC-MCNC: 142 MG/DL
LH SERPL-ACNC: 24.2 IU/L
MCHC RBC-ENTMCNC: 30.2 PG
MCHC RBC-ENTMCNC: 32.2 GM/DL
MCV RBC AUTO: 93.8 FL
NONHDLC SERPL-MCNC: 150 MG/DL
PLATELET # BLD AUTO: 273 K/UL
POTASSIUM SERPL-SCNC: 4.8 MMOL/L
PROLACTIN SERPL-MCNC: 16.2 NG/ML
PROT SERPL-MCNC: 7.3 G/DL
RBC # BLD: 4.54 M/UL
RBC # FLD: 14.6 %
SODIUM SERPL-SCNC: 143 MMOL/L
T4 FREE SERPL-MCNC: 1.1 NG/DL
T4 SERPL-MCNC: 7.9 UG/DL
TRIGL SERPL-MCNC: 42 MG/DL
TSH SERPL-ACNC: 1.99 UIU/ML
WBC # FLD AUTO: 3.24 K/UL

## 2024-09-25 ENCOUNTER — RX RENEWAL (OUTPATIENT)
Age: 64
End: 2024-09-25

## 2024-12-09 ENCOUNTER — APPOINTMENT (OUTPATIENT)
Dept: ENDOCRINOLOGY | Facility: CLINIC | Age: 64
End: 2024-12-09
Payer: COMMERCIAL

## 2024-12-09 PROCEDURE — 36415 COLL VENOUS BLD VENIPUNCTURE: CPT

## 2024-12-10 LAB
25(OH)D3 SERPL-MCNC: 23.5 NG/ML
ALBUMIN SERPL ELPH-MCNC: 4 G/DL
ALP BLD-CCNC: 79 U/L
ALT SERPL-CCNC: 28 U/L
ANION GAP SERPL CALC-SCNC: 12 MMOL/L
AST SERPL-CCNC: 19 U/L
BILIRUB SERPL-MCNC: 0.3 MG/DL
BUN SERPL-MCNC: 9 MG/DL
CALCIUM SERPL-MCNC: 9.1 MG/DL
CHLORIDE SERPL-SCNC: 105 MMOL/L
CHOLEST SERPL-MCNC: 136 MG/DL
CO2 SERPL-SCNC: 25 MMOL/L
CREAT SERPL-MCNC: 0.77 MG/DL
EGFR: 86 ML/MIN/1.73M2
FSH SERPL-MCNC: 44.3 IU/L
GLUCOSE SERPL-MCNC: 95 MG/DL
HCT VFR BLD CALC: 39.7 %
HDLC SERPL-MCNC: 43 MG/DL
HGB BLD-MCNC: 13.3 G/DL
LDLC SERPL CALC-MCNC: 83 MG/DL
LH SERPL-ACNC: 22.2 IU/L
MCHC RBC-ENTMCNC: 29.8 PG
MCHC RBC-ENTMCNC: 33.5 G/DL
MCV RBC AUTO: 89 FL
NONHDLC SERPL-MCNC: 92 MG/DL
PLATELET # BLD AUTO: 265 K/UL
POTASSIUM SERPL-SCNC: 4.4 MMOL/L
PROLACTIN SERPL-MCNC: 14.2 NG/ML
PROT SERPL-MCNC: 6.9 G/DL
RBC # BLD: 4.46 M/UL
RBC # FLD: 13.4 %
SODIUM SERPL-SCNC: 142 MMOL/L
T4 FREE SERPL-MCNC: 1.2 NG/DL
T4 SERPL-MCNC: 8.1 UG/DL
TRIGL SERPL-MCNC: 36 MG/DL
TSH SERPL-ACNC: 2.4 UIU/ML
WBC # FLD AUTO: 2.9 K/UL

## 2024-12-16 ENCOUNTER — APPOINTMENT (OUTPATIENT)
Dept: ENDOCRINOLOGY | Facility: CLINIC | Age: 64
End: 2024-12-16

## 2024-12-16 VITALS
OXYGEN SATURATION: 96 % | HEART RATE: 78 BPM | WEIGHT: 157 LBS | HEIGHT: 63 IN | SYSTOLIC BLOOD PRESSURE: 103 MMHG | DIASTOLIC BLOOD PRESSURE: 67 MMHG | BODY MASS INDEX: 27.82 KG/M2

## 2024-12-16 DIAGNOSIS — Z23 ENCOUNTER FOR IMMUNIZATION: ICD-10-CM

## 2024-12-16 DIAGNOSIS — E03.9 HYPOTHYROIDISM, UNSPECIFIED: ICD-10-CM

## 2024-12-16 DIAGNOSIS — M81.0 AGE-RELATED OSTEOPOROSIS W/OUT CURRENT PATHOLOGICAL FRACTURE: ICD-10-CM

## 2024-12-16 DIAGNOSIS — E78.5 HYPERLIPIDEMIA, UNSPECIFIED: ICD-10-CM

## 2024-12-16 DIAGNOSIS — D35.2 BENIGN NEOPLASM OF PITUITARY GLAND: ICD-10-CM

## 2024-12-16 PROCEDURE — 99214 OFFICE O/P EST MOD 30 MIN: CPT | Mod: 25

## 2024-12-16 PROCEDURE — 90656 IIV3 VACC NO PRSV 0.5 ML IM: CPT

## 2024-12-16 PROCEDURE — G0008: CPT

## 2024-12-24 ENCOUNTER — RX RENEWAL (OUTPATIENT)
Age: 64
End: 2024-12-24

## 2025-03-21 ENCOUNTER — RX RENEWAL (OUTPATIENT)
Age: 65
End: 2025-03-21

## 2025-06-11 ENCOUNTER — APPOINTMENT (OUTPATIENT)
Dept: ENDOCRINOLOGY | Facility: CLINIC | Age: 65
End: 2025-06-11

## 2025-06-11 PROCEDURE — 36415 COLL VENOUS BLD VENIPUNCTURE: CPT

## 2025-06-12 LAB
ACTH SER-ACNC: 16.6 PG/ML
ALBUMIN SERPL ELPH-MCNC: 4.2 G/DL
ALP BLD-CCNC: 98 U/L
ALT SERPL-CCNC: 22 U/L
ANION GAP SERPL CALC-SCNC: 13 MMOL/L
AST SERPL-CCNC: 17 U/L
BILIRUB SERPL-MCNC: 0.2 MG/DL
BUN SERPL-MCNC: 15 MG/DL
CALCIUM SERPL-MCNC: 9.3 MG/DL
CHLORIDE SERPL-SCNC: 106 MMOL/L
CHOLEST SERPL-MCNC: 226 MG/DL
CO2 SERPL-SCNC: 23 MMOL/L
CORTIS SERPL-MCNC: 7.4 UG/DL
CREAT SERPL-MCNC: 0.72 MG/DL
CREAT SPEC-SCNC: 108 MG/DL
DHEA-S SERPL-MCNC: 48.1 UG/DL
EGFRCR SERPLBLD CKD-EPI 2021: 93 ML/MIN/1.73M2
FSH SERPL-MCNC: 45.3 IU/L
GLUCOSE SERPL-MCNC: 86 MG/DL
HDLC SERPL-MCNC: 53 MG/DL
LDLC SERPL-MCNC: 163 MG/DL
LH SERPL-ACNC: 22.2 IU/L
MICROALBUMIN 24H UR DL<=1MG/L-MCNC: 2.2 MG/DL
MICROALBUMIN/CREAT 24H UR-RTO: 21 MG/G
NONHDLC SERPL-MCNC: 172 MG/DL
POTASSIUM SERPL-SCNC: 4.8 MMOL/L
PROLACTIN SERPL-MCNC: 15.3 NG/ML
PROT SERPL-MCNC: 7 G/DL
SODIUM SERPL-SCNC: 142 MMOL/L
T4 FREE SERPL-MCNC: 1 NG/DL
T4 SERPL-MCNC: 7.1 UG/DL
TRIGL SERPL-MCNC: 54 MG/DL
TSH SERPL-ACNC: 1.02 UIU/ML

## 2025-06-16 ENCOUNTER — APPOINTMENT (OUTPATIENT)
Dept: ENDOCRINOLOGY | Facility: CLINIC | Age: 65
End: 2025-06-16
Payer: COMMERCIAL

## 2025-06-16 VITALS
SYSTOLIC BLOOD PRESSURE: 127 MMHG | OXYGEN SATURATION: 96 % | BODY MASS INDEX: 29.5 KG/M2 | HEIGHT: 63 IN | WEIGHT: 166.5 LBS | DIASTOLIC BLOOD PRESSURE: 75 MMHG | HEART RATE: 76 BPM

## 2025-06-16 PROCEDURE — G2211 COMPLEX E/M VISIT ADD ON: CPT

## 2025-06-16 PROCEDURE — 99214 OFFICE O/P EST MOD 30 MIN: CPT

## 2025-06-16 RX ORDER — ALENDRONATE SODIUM 70 MG/1
70 TABLET ORAL
Qty: 4 | Refills: 5 | Status: ACTIVE | COMMUNITY
Start: 2025-06-16 | End: 1900-01-01

## 2025-07-14 ENCOUNTER — RX RENEWAL (OUTPATIENT)
Age: 65
End: 2025-07-14

## 2025-08-18 ENCOUNTER — RX RENEWAL (OUTPATIENT)
Age: 65
End: 2025-08-18